# Patient Record
Sex: FEMALE | Race: WHITE | NOT HISPANIC OR LATINO | Employment: UNEMPLOYED | URBAN - METROPOLITAN AREA
[De-identification: names, ages, dates, MRNs, and addresses within clinical notes are randomized per-mention and may not be internally consistent; named-entity substitution may affect disease eponyms.]

---

## 2021-01-04 ENCOUNTER — VIRTUAL VISIT (OUTPATIENT)
Dept: FAMILY MEDICINE | Facility: OTHER | Age: 66
End: 2021-01-04

## 2021-01-04 NOTE — PROGRESS NOTES
"Date: 2021 15:51:59  Clinician: Zack Guthrie  Clinician NPI: [id number]  Patient: Saadia Denney  Patient : 1955  Patient Address: [address], [place] [address]  Patient Phone: [phone]  Visit Protocol: Shingles  Patient Summary:  Saadia is a 65 year old ( : 1955 ) female who initiated a [facility] Visit for suspected Herpes zoster (shingles). When asked the question \"Please sign me up to receive news, health information and promotions. \", Saadia responded \"Yes\".    Images of her skin condition were uploaded.   Her symptoms started 1-3 days ago. The left side of her body is affected. The rash is located on her back, chest, and arms. The rash is red and includes sores, dry skin, flaky skin, and scaly skin.   The affected area feels warm to touch, tender to touch, like it burns, painful, and itchy. The symptoms interfere with her sleep. Saadia experienced pain or unusual sensations in the location of the rash before it appeared. She feels feverish but her temperature could not be measured.   Symptom details   Pain: The pain is severe (between 7-9 on a 10 point pain scale).   Denied symptoms include drainage, crusts, scabs, numbness, and blisters.   Pertinent medical history  Saadia has had chickenpox, but has not had shingles in the past. She has not received a shingles vaccine.   Saadia denies having immunosuppressive conditions (e.g., chemotherapy, HIV, organ transplant, long-term use of steroids or other immunosuppressive medications, splenectomy).   Saadia has not been told by her provider to avoid NSAIDs.   Saadia does not need a return to work/school note.   Saadia smokes or uses smokeless tobacco.     MEDICATIONS: Claritin oral, ALLERGIES: NKDA  Clinician Response:  Dear Saadia,  Based on the information provided, you have Herpes Zoster (shingles). Shingles is a blistered rash caused by the same virus that causes chickenpox - Varicella Zoster.  Everyone who comes down with shingles has had " chickenpox at some time in their life. After recovering from chickenpox, the inactive virus remains in the nerve cells. Shingles develops if the virus becomes active. This reactivation can happen years or even decades later.   Because the virus spreads along a nerve, the rash is painful and appears as a stripe along one side of the body. The rash contains groups of blisters that break, crust over, and resolve within 3-6 weeks. Although rare, it is possible for the pain to last weeks after the rash has completely healed.  Medication information  I am prescribing:     Acyclovir (Zovirax) 800 mg oral tablet. Take 1 tablet by mouth 5 times per day for 7 days. There are no refills with this prescription.  Medication is used to help speed up the healing process, but may not take your symptoms away completely.  Self care  Shingles is not spread from one person to another. However, because shingles and chickenpox are caused by the same virus, you could spread chickenpox to someone who has not had the illness or been vaccinated against it. Cover the rash with a loose bandage until all blisters scab over to prevent spreading the virus to those at risk for getting chickenpox.  Steps you can take to be as comfortable as possible:     Avoid touching the rash    Apply a cool, wet washcloth to your rash for 15 minutes several times a day    Take a lukewarm bath to soothe the skin. Adding colloidal oatmeal can help even more    Choose clothing and bedding made of a breathable material like cotton    Do not use antibiotic creams or ointments unless recommended by a provider     It is possible to get shingles even if you have had it in the past, so I recommend you get a shingles vaccine. This is important because your risk of getting shingles and having complications from shingles increases as you age. The vaccine is available at the clinic and most pharmacies.  Also, as your provider, I need you to know that becoming tobacco-free is  the most important thing you can do to protect your current and future health.   When to seek care  Please make an appointment to be seen in a clinic or go to an urgent care if any of the following occur:     Your rash doesn't improve after 14 days    You develop new symptoms or your symptoms become worse    Symptoms are so severe that you are unable to sleep or do regular activities    You are still experiencing pain one month after your shingles rash has completely healed    You notice symptoms of a skin infection (spreading redness, pain that is not improving, fevers, warmth)     Seek care in an emergency room if you develop a fever, headache and sensitivity to light.   Diagnosis: Herpes zoster (shingles)  Diagnosis ICD: B02.8  Prescription: acyclovir (Zovirax) 800 mg oral tablet 35 tablet, 7 days supply. Take 1 tablet by mouth 5 times per day for 7 days. Refills: 0, Refill as needed: no, Allow substitutions: yes  Pharmacy: [id number] - [phone] - [address], [place] [address]

## 2021-06-24 ENCOUNTER — OFFICE VISIT (OUTPATIENT)
Dept: URGENT CARE | Facility: URGENT CARE | Age: 66
End: 2021-06-24
Payer: COMMERCIAL

## 2021-06-24 VITALS
SYSTOLIC BLOOD PRESSURE: 132 MMHG | TEMPERATURE: 98.5 F | OXYGEN SATURATION: 97 % | WEIGHT: 170 LBS | DIASTOLIC BLOOD PRESSURE: 80 MMHG | HEART RATE: 74 BPM

## 2021-06-24 DIAGNOSIS — H11.31 SUBCONJUNCTIVAL HEMORRHAGE OF RIGHT EYE: Primary | ICD-10-CM

## 2021-06-24 PROCEDURE — 99202 OFFICE O/P NEW SF 15 MIN: CPT | Performed by: FAMILY MEDICINE

## 2021-06-24 NOTE — PROGRESS NOTES
Assessment & Plan     Subconjunctival hemorrhage of right eye     In the absence of pain, no inciting injury , and history as below this is suggestive of a subconjunctival bleed.   Discussed this can take weeks to resolve and will likely see a transition from red to yellow.     She reports her allergies are under control now and does not seek recommendations.   F/u as needed if symptoms worsen  Bc Polo MD   [facility]    Subjective     Saadia is a 65 year old female who presents to clinic today for the following health issues:  Chief Complaint   Patient presents with     Urgent Care     Eye Problem     c/o eye infection      HPI    She denies any pain of this R lateral eye where new redness has formed      no eye discharge  No visual difficulties or blurred vision  denies eyelid lesions/swelling/masses    Patient does not eye contacts, has corrective lenses    No cough/cold/runny nose symptoms  Does have seasonal allergies and has been rubbing eyes frequently, had a notably itchy day 2 days ago rubbing this right eye    No known exposures to irritants/chemicals.     Remedies attempted include: none      There are no active problems to display for this patient.      Current Outpatient Medications   Medication     NO ACTIVE MEDICATIONS     No current facility-administered medications for this visit.            Objective    /80   Pulse 74   Temp 98.5  F (36.9  C) (Oral)   Wt 77.1 kg (170 lb)   SpO2 97%   Physical Exam     Eyes: PERRL, EOMI, no eyelid lesions, absence of discharge, right lateral eye in moderate presence blood vessels are seen and there is distinct redness.     No results found for any visits on 06/24/21.                  The use of [employer] dictation services may have been used to construct the content in this note; any grammatical or spelling errors are non-intentional. Please contact the author of this note directly if you are in need of any clarification.

## 2021-06-24 NOTE — PATIENT INSTRUCTIONS
Patient Education     Subconjunctival Hemorrhage    A subconjunctival hemorrhage is a result of a broken blood vessel in the white part of the eye. It is usually painless. It may be caused by coughing, sneezing, or vomiting. An injury to the eye can cause this condition, too. It can also be a sign of high blood pressure (hypertension) or a bleeding disorder.   This eye problem can look scary. But the presence of the blood is not serious. It will be reabsorbed without treatment within a few days to a few weeks.   Home care  You may continue your usual activities.  Follow-up care  Follow up with your healthcare provider, or as advised.  When to seek medical advice  Call your healthcare provider or seek medical care right away if any of these occur:     Pain in the eye    Change in vision    The blood does not go away within 3 weeks    Increasing redness or swelling of the eye    Severe headache or dizziness    Signs of bruising or bleeding from other parts of your body  [employer] last reviewed this educational content on 6/1/2020 2000-2021 [employer]. All rights reserved. This information is not intended as a substitute for professional medical care. Always follow your healthcare professional's instructions.

## 2023-03-14 ENCOUNTER — APPOINTMENT (OUTPATIENT)
Dept: URGENT CARE | Facility: CLINIC | Age: 68
End: 2023-03-14

## 2023-04-01 ENCOUNTER — HEALTH MAINTENANCE LETTER (OUTPATIENT)
Age: 68
End: 2023-04-01

## 2024-06-01 ENCOUNTER — HEALTH MAINTENANCE LETTER (OUTPATIENT)
Age: 69
End: 2024-06-01

## 2025-04-12 ENCOUNTER — HEALTH MAINTENANCE LETTER (OUTPATIENT)
Age: 70
End: 2025-04-12

## 2025-06-14 ENCOUNTER — HEALTH MAINTENANCE LETTER (OUTPATIENT)
Age: 70
End: 2025-06-14